# Patient Record
Sex: MALE | Race: BLACK OR AFRICAN AMERICAN | NOT HISPANIC OR LATINO | URBAN - METROPOLITAN AREA
[De-identification: names, ages, dates, MRNs, and addresses within clinical notes are randomized per-mention and may not be internally consistent; named-entity substitution may affect disease eponyms.]

---

## 2017-03-12 ENCOUNTER — EMERGENCY (EMERGENCY)
Facility: HOSPITAL | Age: 77
LOS: 1 days | Discharge: HOME | End: 2017-03-12
Attending: EMERGENCY MEDICINE | Admitting: EMERGENCY MEDICINE
Payer: COMMERCIAL

## 2017-03-12 VITALS
DIASTOLIC BLOOD PRESSURE: 75 MMHG | HEART RATE: 80 BPM | OXYGEN SATURATION: 97 % | SYSTOLIC BLOOD PRESSURE: 128 MMHG | HEIGHT: 64 IN | TEMPERATURE: 99 F | WEIGHT: 173.94 LBS | RESPIRATION RATE: 20 BRPM

## 2017-03-12 DIAGNOSIS — M54.2 CERVICALGIA: ICD-10-CM

## 2017-03-12 DIAGNOSIS — I10 ESSENTIAL (PRIMARY) HYPERTENSION: ICD-10-CM

## 2017-03-12 DIAGNOSIS — M19.90 UNSPECIFIED OSTEOARTHRITIS, UNSPECIFIED SITE: ICD-10-CM

## 2017-03-12 PROCEDURE — 99283 EMERGENCY DEPT VISIT LOW MDM: CPT | Mod: 25

## 2017-03-12 PROCEDURE — 72040 X-RAY EXAM NECK SPINE 2-3 VW: CPT | Mod: 26

## 2017-03-12 PROCEDURE — 99283 EMERGENCY DEPT VISIT LOW MDM: CPT

## 2017-03-12 PROCEDURE — 72040 X-RAY EXAM NECK SPINE 2-3 VW: CPT

## 2017-03-12 RX ORDER — IBUPROFEN 200 MG
1 TABLET ORAL
Qty: 12 | Refills: 0 | OUTPATIENT
Start: 2017-03-12

## 2017-03-12 RX ORDER — METHOCARBAMOL 500 MG/1
1 TABLET, FILM COATED ORAL
Qty: 12 | Refills: 0 | OUTPATIENT
Start: 2017-03-12 | End: 2017-03-15

## 2017-03-12 RX ORDER — IBUPROFEN 200 MG
600 TABLET ORAL ONCE
Qty: 0 | Refills: 0 | Status: COMPLETED | OUTPATIENT
Start: 2017-03-12 | End: 2017-03-12

## 2017-03-12 RX ORDER — METHOCARBAMOL 500 MG/1
1500 TABLET, FILM COATED ORAL ONCE
Qty: 0 | Refills: 0 | Status: COMPLETED | OUTPATIENT
Start: 2017-03-12 | End: 2017-03-12

## 2017-03-12 RX ADMIN — METHOCARBAMOL 1500 MILLIGRAM(S): 500 TABLET, FILM COATED ORAL at 12:12

## 2017-03-12 RX ADMIN — Medication 600 MILLIGRAM(S): at 12:12

## 2017-03-12 NOTE — ED STATDOCS - NS ED MD SCRIBE ATTENDING SCRIBE SECTIONS
HISTORY OF PRESENT ILLNESS/PHYSICAL EXAM/DISPOSITION/HIV/PAST MEDICAL/SURGICAL/SOCIAL HISTORY/VITAL SIGNS( Pullset)/INTAKE ASSESSMENT/SCREENINGS/REVIEW OF SYSTEMS

## 2017-03-12 NOTE — ED STATDOCS - OBJECTIVE STATEMENT
75 y/o M, with hx of HTN, presents to the ED complaining of neck pain x 4 days. Pt states neck pain has been gradually radiating bilaterally upwards to head. He reports difficulty moving his neck. No relief with Tylenol and ASA; ASA was last taken yesterday. Pt denies taking Motrin and Aleve. He denies blood thinners and takes ASA prn. He denies heavy lifting, exertion, falls, and injuries. Pt denies fever, nausea, vomiting, SOB and chest pain. Pt denies hx of CAD and DM. He denies seeing PCP for this complaint. Pt denies tobacco use and alcohol use. No further complaints at this time.   PCP: Dr. Farris

## 2017-03-12 NOTE — ED ADULT TRIAGE NOTE - CHIEF COMPLAINT QUOTE
pt reports having pain to his neck radiating to the back of his head x 4days. pain is relieved by aleve and aspirin but comes back once medication wears off. pt denies nausea or visual changes

## 2017-03-12 NOTE — ED STATDOCS - PROGRESS NOTE DETAILS
Neck pain for a few days. No UE weakness or sensory changes reported. No changes in  strength. No past episodes. No trauma reported.     Review XR findings. Will treat with medications and have patient f/u with ortho-spine. unable to locate patient to review discharge papers or review official XR read. Neck pain for a few days. No UE weakness or sensory changes reported. No changes in  strength. No past episodes. No trauma reported.   Reviewed preliminary XR read results. Will discharge with medications and spine-f/u    Review XR findings. Will treat with medications and have patient f/u with ortho-spine. contacted patient at home. Patient has improved with medications. F/U phone numbers provided. Prescriptions will be picked up tomorrow. Confirmed no sore throat or dysphagia.

## 2017-03-12 NOTE — ED STATDOCS - NEUROLOGICAL, MLM
No Kernig's or Bryzkinkis. normal strength in eyelids, symmetrical smile, tongue midline, normal shoulder shrugs, no pronator drift, normal thumb to other fingers, normal hand over hand, normal finger to nose, normal DTRs, normal strength in BUE and BLE, no Babinski's, normal knee-heel-shin, no Romberg's.

## 2017-03-12 NOTE — ED STATDOCS - ENMT, MLM
Nasal mucosa clear.  Mouth with normal mucosa  Throat has no vesicles, no oropharyngeal exudates and uvula is midline. No JVD. Normal vazquez

## 2017-10-31 ENCOUNTER — EMERGENCY (EMERGENCY)
Facility: HOSPITAL | Age: 77
LOS: 1 days | Discharge: HOME | End: 2017-10-31
Attending: EMERGENCY MEDICINE | Admitting: EMERGENCY MEDICINE
Payer: COMMERCIAL

## 2017-10-31 VITALS — HEIGHT: 64 IN | WEIGHT: 160.06 LBS

## 2017-10-31 LAB
ALBUMIN SERPL ELPH-MCNC: 3.9 G/DL — SIGNIFICANT CHANGE UP (ref 3.3–5.2)
ALP SERPL-CCNC: 80 U/L — SIGNIFICANT CHANGE UP (ref 40–120)
ALT FLD-CCNC: 11 U/L — SIGNIFICANT CHANGE UP
ANION GAP SERPL CALC-SCNC: 13 MMOL/L — SIGNIFICANT CHANGE UP (ref 5–17)
AST SERPL-CCNC: 20 U/L — SIGNIFICANT CHANGE UP
BASOPHILS # BLD AUTO: 0 K/UL — SIGNIFICANT CHANGE UP (ref 0–0.2)
BASOPHILS NFR BLD AUTO: 0.5 % — SIGNIFICANT CHANGE UP (ref 0–2)
BILIRUB SERPL-MCNC: 0.5 MG/DL — SIGNIFICANT CHANGE UP (ref 0.4–2)
BUN SERPL-MCNC: 10 MG/DL — SIGNIFICANT CHANGE UP (ref 8–20)
CALCIUM SERPL-MCNC: 9.2 MG/DL — SIGNIFICANT CHANGE UP (ref 8.6–10.2)
CHLORIDE SERPL-SCNC: 101 MMOL/L — SIGNIFICANT CHANGE UP (ref 98–107)
CO2 SERPL-SCNC: 27 MMOL/L — SIGNIFICANT CHANGE UP (ref 22–29)
CREAT SERPL-MCNC: 1.13 MG/DL — SIGNIFICANT CHANGE UP (ref 0.5–1.3)
EOSINOPHIL # BLD AUTO: 0.2 K/UL — SIGNIFICANT CHANGE UP (ref 0–0.5)
EOSINOPHIL NFR BLD AUTO: 2.8 % — SIGNIFICANT CHANGE UP (ref 0–5)
GLUCOSE SERPL-MCNC: 85 MG/DL — SIGNIFICANT CHANGE UP (ref 70–115)
HCT VFR BLD CALC: 34.4 % — LOW (ref 42–52)
HGB BLD-MCNC: 11.2 G/DL — LOW (ref 14–18)
LYMPHOCYTES # BLD AUTO: 2.3 K/UL — SIGNIFICANT CHANGE UP (ref 1–4.8)
LYMPHOCYTES # BLD AUTO: 37.5 % — SIGNIFICANT CHANGE UP (ref 20–55)
MCHC RBC-ENTMCNC: 29.2 PG — SIGNIFICANT CHANGE UP (ref 27–31)
MCHC RBC-ENTMCNC: 32.6 G/DL — SIGNIFICANT CHANGE UP (ref 32–36)
MCV RBC AUTO: 89.8 FL — SIGNIFICANT CHANGE UP (ref 80–94)
MONOCYTES # BLD AUTO: 0.6 K/UL — SIGNIFICANT CHANGE UP (ref 0–0.8)
MONOCYTES NFR BLD AUTO: 10.6 % — HIGH (ref 3–10)
NEUTROPHILS # BLD AUTO: 2.9 K/UL — SIGNIFICANT CHANGE UP (ref 1.8–8)
NEUTROPHILS NFR BLD AUTO: 48.4 % — SIGNIFICANT CHANGE UP (ref 37–73)
PLATELET # BLD AUTO: 274 K/UL — SIGNIFICANT CHANGE UP (ref 150–400)
POTASSIUM SERPL-MCNC: 3.6 MMOL/L — SIGNIFICANT CHANGE UP (ref 3.5–5.3)
POTASSIUM SERPL-SCNC: 3.6 MMOL/L — SIGNIFICANT CHANGE UP (ref 3.5–5.3)
PROT SERPL-MCNC: 8 G/DL — SIGNIFICANT CHANGE UP (ref 6.6–8.7)
RBC # BLD: 3.83 M/UL — LOW (ref 4.6–6.2)
RBC # FLD: 14 % — SIGNIFICANT CHANGE UP (ref 11–15.6)
SODIUM SERPL-SCNC: 141 MMOL/L — SIGNIFICANT CHANGE UP (ref 135–145)
WBC # BLD: 6 K/UL — SIGNIFICANT CHANGE UP (ref 4.8–10.8)
WBC # FLD AUTO: 6 K/UL — SIGNIFICANT CHANGE UP (ref 4.8–10.8)

## 2017-10-31 PROCEDURE — 74177 CT ABD & PELVIS W/CONTRAST: CPT

## 2017-10-31 PROCEDURE — 85027 COMPLETE CBC AUTOMATED: CPT

## 2017-10-31 PROCEDURE — 74177 CT ABD & PELVIS W/CONTRAST: CPT | Mod: 26

## 2017-10-31 PROCEDURE — 99284 EMERGENCY DEPT VISIT MOD MDM: CPT

## 2017-10-31 PROCEDURE — 99284 EMERGENCY DEPT VISIT MOD MDM: CPT | Mod: 25

## 2017-10-31 PROCEDURE — 80053 COMPREHEN METABOLIC PANEL: CPT

## 2017-10-31 PROCEDURE — 36415 COLL VENOUS BLD VENIPUNCTURE: CPT

## 2017-10-31 RX ORDER — SODIUM CHLORIDE 9 MG/ML
1000 INJECTION INTRAMUSCULAR; INTRAVENOUS; SUBCUTANEOUS ONCE
Qty: 0 | Refills: 0 | Status: COMPLETED | OUTPATIENT
Start: 2017-10-31 | End: 2017-10-31

## 2017-10-31 RX ORDER — SENNA PLUS 8.6 MG/1
2 TABLET ORAL
Qty: 6 | Refills: 0 | OUTPATIENT
Start: 2017-10-31 | End: 2017-11-03

## 2017-10-31 RX ORDER — METRONIDAZOLE 500 MG
1 TABLET ORAL
Qty: 20 | Refills: 0 | OUTPATIENT
Start: 2017-10-31 | End: 2017-11-10

## 2017-10-31 RX ORDER — METRONIDAZOLE 500 MG
500 TABLET ORAL ONCE
Qty: 0 | Refills: 0 | Status: COMPLETED | OUTPATIENT
Start: 2017-10-31 | End: 2017-10-31

## 2017-10-31 RX ORDER — DOCUSATE SODIUM 100 MG
1 CAPSULE ORAL
Qty: 6 | Refills: 0 | OUTPATIENT
Start: 2017-10-31 | End: 2017-11-03

## 2017-10-31 RX ORDER — CIPROFLOXACIN LACTATE 400MG/40ML
500 VIAL (ML) INTRAVENOUS ONCE
Qty: 0 | Refills: 0 | Status: COMPLETED | OUTPATIENT
Start: 2017-10-31 | End: 2017-10-31

## 2017-10-31 RX ORDER — CIPROFLOXACIN LACTATE 400MG/40ML
1 VIAL (ML) INTRAVENOUS
Qty: 20 | Refills: 0 | OUTPATIENT
Start: 2017-10-31 | End: 2017-11-10

## 2017-10-31 RX ADMIN — SODIUM CHLORIDE 1000 MILLILITER(S): 9 INJECTION INTRAMUSCULAR; INTRAVENOUS; SUBCUTANEOUS at 19:03

## 2017-10-31 RX ADMIN — Medication 500 MILLIGRAM(S): at 23:44

## 2017-10-31 NOTE — ED STATDOCS - OBJECTIVE STATEMENT
76 y/o M pt with hx of HTN, constipation presents to ED c/o abdominal distention and constipation for 2 weeks. Last bowel movement was 1 1/2 weeks ago. Positive flatulence. No nausea, vomiting. No further complaints at this time. 78 y/o M pt with hx of HTN, constipation presents to ED c/o abdominal distention and constipation for 2 weeks. Last bowel movement was 1 1/2 weeks ago. Positive flatulence. No nausea, vomiting, or abdominal pain. No further complaints at this time.

## 2017-10-31 NOTE — ED STATDOCS - MEDICAL DECISION MAKING DETAILS
Labs and abdominal CT scan. Labs and abdominal CT scan to r/o obstruction; though low suspicion given no pain and no vomiting.

## 2017-10-31 NOTE — ED STATDOCS - PROGRESS NOTE DETAILS
PA NOTE: Pt discussed at length with attending HPI/ROS/PE confirmed. PT seen resting computably in no acute distress in chair, PT states that he has had 6 days of watery dhiarhea, denies fever, chills, abd pain, N/V.   PE: ABD, nondistended, soft-nontender, no guarding, no rebound.   PLAN: PT tolerating PO intake,  PT informed of all results, PT informed of the need to have follow up colonoscopy for possibility of mass or CA, PT will be DC home with bowl regiment for retained stool, ABx, OTC pain control, follow up to GI and PCP, educated about when to return to the ED if needed. PT verbalizes that he understands all instructions and results

## 2017-10-31 NOTE — ED STATDOCS - ATTENDING CONTRIBUTION TO CARE
I, Jovana Bhatti, performed the initial face to face bedside interview with this patient regarding history of present illness, review of symptoms and relevant past medical, social and family history.  I completed an independent physical examination.  I was the initial provider who evaluated this patient. I have signed out the follow up of any pending tests (i.e. labs, radiological studies) to the ACP.  I have communicated the patient’s plan of care and disposition with the ACP.  The history, relevant review of systems, past medical and surgical history, medical decision making, and physical examination was documented by the scribe in my presence and I attest to the accuracy of the documentation.

## 2017-11-01 VITALS
HEART RATE: 60 BPM | DIASTOLIC BLOOD PRESSURE: 88 MMHG | TEMPERATURE: 98 F | OXYGEN SATURATION: 100 % | SYSTOLIC BLOOD PRESSURE: 184 MMHG | RESPIRATION RATE: 18 BRPM

## 2018-04-27 NOTE — ED STATDOCS - EYES, MLM
Communicable/Infectious
clear bilaterally.  Pupils 4mm equal, round, and reactive to light. + bilateral cataracts

## 2018-05-22 ENCOUNTER — EMERGENCY (EMERGENCY)
Facility: HOSPITAL | Age: 78
LOS: 1 days | Discharge: HOME | End: 2018-05-22
Attending: STUDENT IN AN ORGANIZED HEALTH CARE EDUCATION/TRAINING PROGRAM
Payer: COMMERCIAL

## 2018-05-22 VITALS
OXYGEN SATURATION: 100 % | HEIGHT: 66 IN | WEIGHT: 195.11 LBS | HEART RATE: 60 BPM | SYSTOLIC BLOOD PRESSURE: 186 MMHG | TEMPERATURE: 99 F | RESPIRATION RATE: 17 BRPM | DIASTOLIC BLOOD PRESSURE: 83 MMHG

## 2018-05-22 VITALS
HEART RATE: 51 BPM | DIASTOLIC BLOOD PRESSURE: 75 MMHG | SYSTOLIC BLOOD PRESSURE: 187 MMHG | RESPIRATION RATE: 20 BRPM | OXYGEN SATURATION: 100 % | TEMPERATURE: 97 F

## 2018-05-22 LAB
ANION GAP SERPL CALC-SCNC: 11 MMOL/L — SIGNIFICANT CHANGE UP (ref 5–17)
APPEARANCE UR: CLEAR — SIGNIFICANT CHANGE UP
APTT BLD: 37 SEC — SIGNIFICANT CHANGE UP (ref 27.5–37.4)
BACTERIA # UR AUTO: ABNORMAL
BASOPHILS # BLD AUTO: 0 K/UL — SIGNIFICANT CHANGE UP (ref 0–0.2)
BASOPHILS NFR BLD AUTO: 0.6 % — SIGNIFICANT CHANGE UP (ref 0–2)
BILIRUB UR-MCNC: NEGATIVE — SIGNIFICANT CHANGE UP
BUN SERPL-MCNC: 12 MG/DL — SIGNIFICANT CHANGE UP (ref 8–20)
CALCIUM SERPL-MCNC: 8.6 MG/DL — SIGNIFICANT CHANGE UP (ref 8.6–10.2)
CHLORIDE SERPL-SCNC: 105 MMOL/L — SIGNIFICANT CHANGE UP (ref 98–107)
CO2 SERPL-SCNC: 27 MMOL/L — SIGNIFICANT CHANGE UP (ref 22–29)
COLOR SPEC: YELLOW — SIGNIFICANT CHANGE UP
CREAT SERPL-MCNC: 0.87 MG/DL — SIGNIFICANT CHANGE UP (ref 0.5–1.3)
DIFF PNL FLD: NEGATIVE — SIGNIFICANT CHANGE UP
EOSINOPHIL # BLD AUTO: 0.3 K/UL — SIGNIFICANT CHANGE UP (ref 0–0.5)
EOSINOPHIL NFR BLD AUTO: 5.5 % — HIGH (ref 0–5)
EPI CELLS # UR: SIGNIFICANT CHANGE UP
GLUCOSE SERPL-MCNC: 95 MG/DL — SIGNIFICANT CHANGE UP (ref 70–115)
GLUCOSE UR QL: NEGATIVE MG/DL — SIGNIFICANT CHANGE UP
HCT VFR BLD CALC: 29.9 % — LOW (ref 42–52)
HGB BLD-MCNC: 9.1 G/DL — LOW (ref 14–18)
INR BLD: 1.13 RATIO — SIGNIFICANT CHANGE UP (ref 0.88–1.16)
KETONES UR-MCNC: NEGATIVE — SIGNIFICANT CHANGE UP
LEUKOCYTE ESTERASE UR-ACNC: ABNORMAL
LYMPHOCYTES # BLD AUTO: 2.3 K/UL — SIGNIFICANT CHANGE UP (ref 1–4.8)
LYMPHOCYTES # BLD AUTO: 45.4 % — SIGNIFICANT CHANGE UP (ref 20–55)
MAGNESIUM SERPL-MCNC: 2 MG/DL — SIGNIFICANT CHANGE UP (ref 1.6–2.6)
MCHC RBC-ENTMCNC: 26.8 PG — LOW (ref 27–31)
MCHC RBC-ENTMCNC: 30.4 G/DL — LOW (ref 32–36)
MCV RBC AUTO: 88.2 FL — SIGNIFICANT CHANGE UP (ref 80–94)
MONOCYTES # BLD AUTO: 0.5 K/UL — SIGNIFICANT CHANGE UP (ref 0–0.8)
MONOCYTES NFR BLD AUTO: 9.6 % — SIGNIFICANT CHANGE UP (ref 3–10)
NEUTROPHILS # BLD AUTO: 2 K/UL — SIGNIFICANT CHANGE UP (ref 1.8–8)
NEUTROPHILS NFR BLD AUTO: 38.7 % — SIGNIFICANT CHANGE UP (ref 37–73)
NITRITE UR-MCNC: NEGATIVE — SIGNIFICANT CHANGE UP
NT-PROBNP SERPL-SCNC: 223 PG/ML — SIGNIFICANT CHANGE UP (ref 0–300)
PH UR: 6 — SIGNIFICANT CHANGE UP (ref 5–8)
PLATELET # BLD AUTO: 222 K/UL — SIGNIFICANT CHANGE UP (ref 150–400)
POTASSIUM SERPL-MCNC: 4.2 MMOL/L — SIGNIFICANT CHANGE UP (ref 3.5–5.3)
POTASSIUM SERPL-SCNC: 4.2 MMOL/L — SIGNIFICANT CHANGE UP (ref 3.5–5.3)
PROT UR-MCNC: NEGATIVE MG/DL — SIGNIFICANT CHANGE UP
PROTHROM AB SERPL-ACNC: 12.5 SEC — SIGNIFICANT CHANGE UP (ref 9.8–12.7)
RBC # BLD: 3.39 M/UL — LOW (ref 4.6–6.2)
RBC # FLD: 18.8 % — HIGH (ref 11–15.6)
RBC CASTS # UR COMP ASSIST: SIGNIFICANT CHANGE UP /HPF (ref 0–4)
SODIUM SERPL-SCNC: 143 MMOL/L — SIGNIFICANT CHANGE UP (ref 135–145)
SP GR SPEC: 1.01 — SIGNIFICANT CHANGE UP (ref 1.01–1.02)
TROPONIN T SERPL-MCNC: <0.01 NG/ML — SIGNIFICANT CHANGE UP (ref 0–0.06)
UROBILINOGEN FLD QL: NEGATIVE MG/DL — SIGNIFICANT CHANGE UP
WBC # BLD: 5.1 K/UL — SIGNIFICANT CHANGE UP (ref 4.8–10.8)
WBC # FLD AUTO: 5.1 K/UL — SIGNIFICANT CHANGE UP (ref 4.8–10.8)
WBC UR QL: SIGNIFICANT CHANGE UP

## 2018-05-22 PROCEDURE — 99284 EMERGENCY DEPT VISIT MOD MDM: CPT

## 2018-05-22 PROCEDURE — 84484 ASSAY OF TROPONIN QUANT: CPT

## 2018-05-22 PROCEDURE — 36415 COLL VENOUS BLD VENIPUNCTURE: CPT

## 2018-05-22 PROCEDURE — 85730 THROMBOPLASTIN TIME PARTIAL: CPT

## 2018-05-22 PROCEDURE — 71046 X-RAY EXAM CHEST 2 VIEWS: CPT | Mod: 26

## 2018-05-22 PROCEDURE — 93970 EXTREMITY STUDY: CPT | Mod: 26

## 2018-05-22 PROCEDURE — 83880 ASSAY OF NATRIURETIC PEPTIDE: CPT

## 2018-05-22 PROCEDURE — 85027 COMPLETE CBC AUTOMATED: CPT

## 2018-05-22 PROCEDURE — 81001 URINALYSIS AUTO W/SCOPE: CPT

## 2018-05-22 PROCEDURE — 83735 ASSAY OF MAGNESIUM: CPT

## 2018-05-22 PROCEDURE — 93010 ELECTROCARDIOGRAM REPORT: CPT

## 2018-05-22 PROCEDURE — 80048 BASIC METABOLIC PNL TOTAL CA: CPT

## 2018-05-22 PROCEDURE — 85610 PROTHROMBIN TIME: CPT

## 2018-05-22 PROCEDURE — 93970 EXTREMITY STUDY: CPT

## 2018-05-22 PROCEDURE — 93005 ELECTROCARDIOGRAM TRACING: CPT

## 2018-05-22 PROCEDURE — 87086 URINE CULTURE/COLONY COUNT: CPT

## 2018-05-22 PROCEDURE — 71046 X-RAY EXAM CHEST 2 VIEWS: CPT

## 2018-05-22 RX ORDER — FUROSEMIDE 40 MG
20 TABLET ORAL ONCE
Qty: 0 | Refills: 0 | Status: COMPLETED | OUTPATIENT
Start: 2018-05-22 | End: 2018-05-22

## 2018-05-22 RX ORDER — FUROSEMIDE 40 MG
1 TABLET ORAL
Qty: 5 | Refills: 0 | OUTPATIENT
Start: 2018-05-22 | End: 2018-05-31

## 2018-05-22 RX ADMIN — Medication 20 MILLIGRAM(S): at 22:22

## 2018-05-22 NOTE — ED PROVIDER NOTE - OBJECTIVE STATEMENT
76 y/o M pt with hx of HTN, HLD, Hypothyroidism presents to ED c/o b/l lower extremity edema starting 2 days ago. Pt states he sleeps with 2 pillows. Unknown last time he's seen a cardiologist. Denies chest pain, SOB, fevers, chills, cough. No further complaints at this time.

## 2018-05-22 NOTE — ED PROVIDER NOTE - PROGRESS NOTE DETAILS
As per sign-out from Dr. Thompson, 76 yo male with acute lower extremity edema for the past two days whom presents from his pmd's office for evaluation.. Patient pending US. If negative Patient resting comfortably. Labs and radiology studies are as noted. Patient stable with dependent edema.

## 2018-05-22 NOTE — ED ADULT NURSE NOTE - OBJECTIVE STATEMENT
78y/o male c/o right lower exretmity swelling. Pt denies chest discomfort, numbness or tingling, N/V/D. Pt ROM to all extremities, abd soft non tender, non distended. will continue to monitor

## 2018-05-22 NOTE — ED ADULT NURSE NOTE - CHPI ED SYMPTOMS NEG
no chills/no shortness of breath/no dizziness/no vomiting/no chest pain/no cough/no diaphoresis/no back pain/no fever/no nausea/no syncope

## 2018-05-23 LAB
CULTURE RESULTS: NO GROWTH — SIGNIFICANT CHANGE UP
SPECIMEN SOURCE: SIGNIFICANT CHANGE UP

## 2018-10-03 PROBLEM — I10 ESSENTIAL (PRIMARY) HYPERTENSION: Chronic | Status: ACTIVE | Noted: 2017-03-12

## 2018-10-05 ENCOUNTER — APPOINTMENT (OUTPATIENT)
Dept: CARDIOLOGY | Facility: CLINIC | Age: 78
End: 2018-10-05
Payer: MEDICARE

## 2018-10-05 ENCOUNTER — NON-APPOINTMENT (OUTPATIENT)
Age: 78
End: 2018-10-05

## 2018-10-05 VITALS
BODY MASS INDEX: 22.88 KG/M2 | HEIGHT: 64 IN | DIASTOLIC BLOOD PRESSURE: 70 MMHG | SYSTOLIC BLOOD PRESSURE: 150 MMHG | HEART RATE: 60 BPM | WEIGHT: 134 LBS | OXYGEN SATURATION: 100 % | RESPIRATION RATE: 14 BRPM

## 2018-10-05 DIAGNOSIS — E03.9 HYPOTHYROIDISM, UNSPECIFIED: ICD-10-CM

## 2018-10-05 DIAGNOSIS — M10.9 GOUT, UNSPECIFIED: ICD-10-CM

## 2018-10-05 DIAGNOSIS — N40.0 BENIGN PROSTATIC HYPERPLASIA WITHOUT LOWER URINARY TRACT SYMPMS: ICD-10-CM

## 2018-10-05 DIAGNOSIS — Z78.9 OTHER SPECIFIED HEALTH STATUS: ICD-10-CM

## 2018-10-05 DIAGNOSIS — R60.0 LOCALIZED EDEMA: ICD-10-CM

## 2018-10-05 DIAGNOSIS — K61.1 RECTAL ABSCESS: ICD-10-CM

## 2018-10-05 DIAGNOSIS — Z86.79 PERSONAL HISTORY OF OTHER DISEASES OF THE CIRCULATORY SYSTEM: ICD-10-CM

## 2018-10-05 DIAGNOSIS — Z86.39 PERSONAL HISTORY OF OTHER ENDOCRINE, NUTRITIONAL AND METABOLIC DISEASE: ICD-10-CM

## 2018-10-05 PROCEDURE — 93000 ELECTROCARDIOGRAM COMPLETE: CPT

## 2018-10-05 PROCEDURE — 99205 OFFICE O/P NEW HI 60 MIN: CPT

## 2018-10-05 RX ORDER — OXYBUTYNIN CHLORIDE 10 MG/1
10 TABLET, EXTENDED RELEASE ORAL DAILY
Refills: 0 | Status: ACTIVE | COMMUNITY

## 2018-10-05 RX ORDER — TAMSULOSIN HYDROCHLORIDE 0.4 MG/1
0.4 CAPSULE ORAL DAILY
Refills: 0 | Status: ACTIVE | COMMUNITY

## 2018-10-05 RX ORDER — ALLOPURINOL 300 MG/1
300 TABLET ORAL
Refills: 0 | Status: ACTIVE | COMMUNITY

## 2018-10-05 RX ORDER — LEVOTHYROXINE SODIUM 0.12 MG/1
125 TABLET ORAL DAILY
Refills: 0 | Status: ACTIVE | COMMUNITY

## 2018-10-05 RX ORDER — PRAVASTATIN SODIUM 40 MG/1
40 TABLET ORAL
Refills: 0 | Status: ACTIVE | COMMUNITY

## 2018-10-23 ENCOUNTER — APPOINTMENT (OUTPATIENT)
Dept: CARDIOLOGY | Facility: CLINIC | Age: 78
End: 2018-10-23
Payer: MEDICARE

## 2018-10-26 ENCOUNTER — RESULT CHARGE (OUTPATIENT)
Age: 78
End: 2018-10-26

## 2018-10-26 DIAGNOSIS — R00.1 BRADYCARDIA, UNSPECIFIED: ICD-10-CM

## 2018-10-30 ENCOUNTER — APPOINTMENT (OUTPATIENT)
Dept: CARDIOLOGY | Facility: CLINIC | Age: 78
End: 2018-10-30
Payer: MEDICARE

## 2018-10-30 PROBLEM — R00.1 SINUS BRADYCARDIA: Status: ACTIVE | Noted: 2018-10-05

## 2018-10-30 PROCEDURE — A9500: CPT

## 2018-10-30 PROCEDURE — 93015 CV STRESS TEST SUPVJ I&R: CPT

## 2018-10-30 PROCEDURE — 78452 HT MUSCLE IMAGE SPECT MULT: CPT

## 2018-10-30 PROCEDURE — 93970 EXTREMITY STUDY: CPT

## 2018-11-19 ENCOUNTER — APPOINTMENT (OUTPATIENT)
Dept: PULMONOLOGY | Facility: CLINIC | Age: 78
End: 2018-11-19

## 2018-11-19 ENCOUNTER — FORM ENCOUNTER (OUTPATIENT)
Age: 78
End: 2018-11-19

## 2018-11-20 ENCOUNTER — OUTPATIENT (OUTPATIENT)
Dept: OUTPATIENT SERVICES | Facility: HOSPITAL | Age: 78
LOS: 1 days | End: 2018-11-20
Payer: COMMERCIAL

## 2018-11-20 DIAGNOSIS — Z01.810 ENCOUNTER FOR PREPROCEDURAL CARDIOVASCULAR EXAMINATION: ICD-10-CM

## 2018-11-20 PROCEDURE — 93306 TTE W/DOPPLER COMPLETE: CPT

## 2018-11-20 PROCEDURE — 93306 TTE W/DOPPLER COMPLETE: CPT | Mod: 26

## 2019-01-29 ENCOUNTER — APPOINTMENT (OUTPATIENT)
Dept: CARDIOLOGY | Facility: CLINIC | Age: 79
End: 2019-01-29

## 2019-06-14 ENCOUNTER — NON-APPOINTMENT (OUTPATIENT)
Age: 79
End: 2019-06-14

## 2019-06-14 ENCOUNTER — APPOINTMENT (OUTPATIENT)
Dept: CARDIOLOGY | Facility: CLINIC | Age: 79
End: 2019-06-14
Payer: MEDICARE

## 2019-06-14 VITALS
BODY MASS INDEX: 27.14 KG/M2 | OXYGEN SATURATION: 99 % | DIASTOLIC BLOOD PRESSURE: 80 MMHG | HEART RATE: 61 BPM | HEIGHT: 64 IN | WEIGHT: 159 LBS | SYSTOLIC BLOOD PRESSURE: 148 MMHG

## 2019-06-14 DIAGNOSIS — R94.31 ABNORMAL ELECTROCARDIOGRAM [ECG] [EKG]: ICD-10-CM

## 2019-06-14 DIAGNOSIS — I10 ESSENTIAL (PRIMARY) HYPERTENSION: ICD-10-CM

## 2019-06-14 DIAGNOSIS — Z01.810 ENCOUNTER FOR PREPROCEDURAL CARDIOVASCULAR EXAMINATION: ICD-10-CM

## 2019-06-14 DIAGNOSIS — E78.5 HYPERLIPIDEMIA, UNSPECIFIED: ICD-10-CM

## 2019-06-14 PROCEDURE — 93000 ELECTROCARDIOGRAM COMPLETE: CPT

## 2019-06-14 PROCEDURE — 99215 OFFICE O/P EST HI 40 MIN: CPT

## 2019-06-14 RX ORDER — LISINOPRIL 20 MG/1
20 TABLET ORAL DAILY
Qty: 30 | Refills: 0 | Status: ACTIVE | COMMUNITY
Start: 1900-01-01 | End: 1900-01-01

## 2019-06-14 NOTE — CARDIOLOGY SUMMARY
[No Ischemia] : no Ischemia [No Exercise Ind Arr] : no exercise induced arrhythmias [No Symptoms] : no Symptoms [___] : [unfilled] [LVEF ___%] : LVEF [unfilled]% [Mild] : mild LV dysfunction [Normal] : normal LA size [None] : no mitral regurgitation

## 2019-06-14 NOTE — HISTORY OF PRESENT ILLNESS
[Preoperative Visit] : for a medical evaluation prior to surgery [Scheduled Procedure ___] : a [unfilled] [Date of Surgery ___] : on [unfilled] [Surgeon Name ___] : surgeon: [unfilled] [Stable] : Stable [Fever] : no fever [Chills] : no chills [Chest Pain] : no chest pain [Fatigue] : no fatigue [Cough] : no cough [Dyspnea] : no dyspnea [Dysuria] : no dysuria [Urinary Frequency] : no urinary frequency [Nausea] : no nausea [Vomiting] : no vomiting [Diarrhea] : diarrhea [Abdominal Pain] : abdominal pain [Easy Bruising] : no easy bruising [Lower Extremity Swelling] : no lower extremity swelling [Poor Exercise Tolerance] : no poor exercise tolerance [Diabetes] : no diabetes [Cardiovascular Disease] : no cardiovascular disease [Pulmonary Disease] : no pulmonary disease [Anti-Platelet Agents] : no anti-platelet agents [Nicotine Dependence] : no nicotine dependence [Renal Disease] : no renal disease [Alcohol Use] : no  alcohol use [GI Disease] : no gastrointestinal disease [Sleep Apnea] : no sleep apnea [Thromboembolic Problems] : no thromboembolic problems [Clotting Disorder] : no clotting disorder [Frequent use of NSAIDs] : frequent use of NSAIDs [Bleeding Disorder] : no bleeding disorder [Transfusion Reaction] : no transfusion reaction [Steroid Use in Last 6 Months] : no steroid use in the last six months [Prior Anesthesia] : No prior anesthesia [Electrocardiogram] : ~T an ECG ~C was performed [Metabolic Capacity ___Mets%] : The patient has a metabolic capacity of [unfilled] Mets%  [Good] : Good [de-identified] : [facility]  [de-identified] : can walk > 20 blocks.  [FreeTextEntry1] : Pre-operative cardiovascular risk evaluation and management and LE edema \par \par he is scheduled for colonoscopy. he had one done in 2018. he is till bleeding and  he needs to get one more colonoscopy. \par \par \par \par old note: This is a 78 year old male with hhistory of hypertension and dirrhea and rectal abcess here for Pre-operative cardiovascular risk evaluation and management for colonoscopy for cancer screening\par patient complians of LE edema. patient states he he stands up the Edema comes back 1 hr\par Eveluate for dedicated reflux and DVT.  2 studies. \par he also complaisn of dirrehea\par No chest pain. NO dyspnea. No dizziness. + abdominal distension. \par

## 2019-06-14 NOTE — DISCUSSION/SUMMARY
[Procedure Low Risk] : the procedure risk is low [Optimized for Surgery] : the patient is optimized for surgery [As per surgery] : as per surgery [Continue] : Continue medications as currently directed [FreeTextEntry1] : This is a 78 year old male with history of hypertension and Gi disease here for Pre-operative cardiovascular risk evaluation and management for colonoscopy.\par 1) LE edema: mild.  not on diuretics. \par \par 2)  Pre-operative cardiovascular risk evaluation and management : no cardiac symptoms. Prior stres test no ischemia.  Pre-operative cardiovascular risk evaluation and management : No cardiac Symptoms. No diagnostic ischemic changes on ECG. METs > 4.  RCRI score < 1%. Deleon perioperative risk score < 1%. NSQIP score < 1%. No further cardiac work up is needed. Proceed for surgery as indicated.\par Any further work up will not change the risk of this patient. Benefits of surgery outweigh the risk. \par \par 3) Marked bradycardia: Asymptomatic. resolved.  \par 4) CMP: LVEF 52%. Hypertension heart disease. ct BP control.

## 2019-06-14 NOTE — PHYSICAL EXAM
[General Appearance - Well Developed] : well developed [Normal Appearance] : normal appearance [Well Groomed] : well groomed [General Appearance - Well Nourished] : well nourished [No Deformities] : no deformities [General Appearance - In No Acute Distress] : no acute distress [Normal Conjunctiva] : the conjunctiva exhibited no abnormalities [Eyelids - No Xanthelasma] : the eyelids demonstrated no xanthelasmas [Normal Oral Mucosa] : normal oral mucosa [No Oral Pallor] : no oral pallor [No Oral Cyanosis] : no oral cyanosis [Normal Jugular Venous A Waves Present] : normal jugular venous A waves present [No Jugular Venous Latham A Waves] : no jugular venous latham A waves [Normal Jugular Venous V Waves Present] : normal jugular venous V waves present [Respiration, Rhythm And Depth] : normal respiratory rhythm and effort [Exaggerated Use Of Accessory Muscles For Inspiration] : no accessory muscle use [Auscultation Breath Sounds / Voice Sounds] : lungs were clear to auscultation bilaterally [Heart Rate And Rhythm] : heart rate and rhythm were normal [Heart Sounds] : normal S1 and S2 [Murmurs] : no murmurs present [Abdomen Soft] : soft [Abdomen Tenderness] : non-tender [Abdomen Mass (___ Cm)] : no abdominal mass palpated [Nail Clubbing] : no clubbing of the fingernails [Petechial Hemorrhages (___cm)] : no petechial hemorrhages [Cyanosis, Localized] : no localized cyanosis [FreeTextEntry1] : 1+ LE edema.  [Skin Color & Pigmentation] : normal skin color and pigmentation [] : no rash [No Venous Stasis] : no venous stasis [Skin Lesions] : no skin lesions [No Xanthoma] : no  xanthoma was observed [No Skin Ulcers] : no skin ulcer [Oriented To Time, Place, And Person] : oriented to person, place, and time [Affect] : the affect was normal [Mood] : the mood was normal [No Anxiety] : not feeling anxious

## 2019-06-14 NOTE — REVIEW OF SYSTEMS
[see HPI] : see HPI [Abdominal Pain] : no abdominal pain [Nausea] : no nausea [Heartburn] : no heartburn [Change in Appetite] : no change in appetite [Negative] : Heme/Lymph

## 2019-06-17 ENCOUNTER — OTHER (OUTPATIENT)
Age: 79
End: 2019-06-17

## 2019-06-17 LAB
ALBUMIN SERPL ELPH-MCNC: 3.6 G/DL
ALP BLD-CCNC: 95 U/L
ALT SERPL-CCNC: 6 U/L
ANION GAP SERPL CALC-SCNC: 10 MMOL/L
AST SERPL-CCNC: 12 U/L
BILIRUB SERPL-MCNC: 0.4 MG/DL
BUN SERPL-MCNC: 13 MG/DL
CALCIUM SERPL-MCNC: 8.8 MG/DL
CHLORIDE SERPL-SCNC: 106 MMOL/L
CO2 SERPL-SCNC: 26 MMOL/L
CREAT SERPL-MCNC: 0.96 MG/DL
GLUCOSE SERPL-MCNC: 99 MG/DL
NT-PROBNP SERPL-MCNC: 100 PG/ML
POTASSIUM SERPL-SCNC: 4 MMOL/L
PROT SERPL-MCNC: 7 G/DL
SODIUM SERPL-SCNC: 141 MMOL/L
TSH SERPL-ACNC: 8.29 UIU/ML

## 2019-09-17 ENCOUNTER — APPOINTMENT (OUTPATIENT)
Dept: CARDIOLOGY | Facility: CLINIC | Age: 79
End: 2019-09-17

## 2020-07-03 ENCOUNTER — EMERGENCY (EMERGENCY)
Facility: HOSPITAL | Age: 80
LOS: 1 days | Discharge: HOME | End: 2020-07-03
Attending: EMERGENCY MEDICINE
Payer: MEDICARE

## 2020-07-03 VITALS
OXYGEN SATURATION: 97 % | HEART RATE: 83 BPM | WEIGHT: 184.97 LBS | RESPIRATION RATE: 18 BRPM | TEMPERATURE: 99 F | DIASTOLIC BLOOD PRESSURE: 92 MMHG | HEIGHT: 64 IN | SYSTOLIC BLOOD PRESSURE: 199 MMHG

## 2020-07-03 PROCEDURE — 99283 EMERGENCY DEPT VISIT LOW MDM: CPT

## 2020-07-03 RX ORDER — METHOCARBAMOL 500 MG/1
750 TABLET, FILM COATED ORAL ONCE
Refills: 0 | Status: COMPLETED | OUTPATIENT
Start: 2020-07-03 | End: 2020-07-03

## 2020-07-03 RX ADMIN — METHOCARBAMOL 750 MILLIGRAM(S): 500 TABLET, FILM COATED ORAL at 13:01

## 2020-07-03 NOTE — ED STATDOCS - CLINICAL SUMMARY MEDICAL DECISION MAKING FREE TEXT BOX
Patient with asymptomatic HTN, mild neck pain. Will give Robaxin. Ready for discharge with PCP follow up.

## 2020-07-03 NOTE — ED ADULT TRIAGE NOTE - CHIEF COMPLAINT QUOTE
pt A&Ox 4 came to ED From home c/o high blood pressure and left side neck tension x 2 days. Pt denies any chest pain, SOB, dizziness. Pt took BP meds this AM. NAD noted, ambulatory in ED

## 2020-07-03 NOTE — ED STATDOCS - OBJECTIVE STATEMENT
79 year old male with a hx of HTN, hyperlipidemia, hypothyroidism presenting to the ED because he noted his BP was high today. He also has intermittent mild L neck stiffness, relieved with ASA. He has had similar pain in the past. He denies chest pain, SOB, fever, weakness, numbness, tingling, vision changes. He states he called his his PMD, and was told to come to the ED. PT does not recall what his BP meds are but has been taking them. 79 year old male with a hx of HTN, hyperlipidemia, hypothyroidism presenting to the ED because he noted his BP was high today. He also has intermittent mild L neck stiffness, relieved with ASA. He has had similar pain in the past. He denies chest pain, SOB, fever, weakness, numbness, tingling, vision changes, leg pain, leg swelling, changes in urination. He states he called his PMD, and was told to come to the ED. PT does not recall what his BP meds are but has been taking them.

## 2020-07-03 NOTE — ED STATDOCS - PHYSICAL EXAMINATION
Gen: Well appearing in NAD  Head: NC/AT  Neck: trachea midline, mild L paraspinal tenderness to palpation, no bruit.   Resp:  No distress, lungs CTAB  CV: regular rate and rhythm.   Ext: no deformities  Neuro:  A&O appears non focal  Skin:  Warm and dry as visualized  Psych:  Normal affect and mood Gen: Well appearing in NAD  Head: NC/AT  Neck: trachea midline, mild L paraspinal tenderness to palpation, FROM, no bruit.   Resp:  No distress, lungs CTAB  CV: regular rate and rhythm.   Ext: no deformities, no edema, no calf ttp.  Neuro:  A&O appears non focal  Skin:  Warm and dry as visualized  Psych:  Normal affect and mood

## 2020-07-03 NOTE — ED STATDOCS - PATIENT PORTAL LINK FT
You can access the FollowMyHealth Patient Portal offered by [facility] by registering at the following website: [email or web address]. By joining [employer]’s FollowMyHealth portal, you will also be able to view your health information using other applications (apps) compatible with our system.

## 2020-07-03 NOTE — ED STATDOCS - NSFOLLOWUPINSTRUCTIONS_ED_ALL_ED_FT
- Follow up with your doctor within 2-3 days.   - If you are going to check your blood pressure, check it once per day at the same time. Keep a log to show your primary doctor.   - Take your medication as prescribed.   - Return to the ED for any new or worsening symptoms.     Hypertension    Hypertension, commonly called high blood pressure, is when the force of blood pumping through your arteries is too strong. Hypertension forces your heart to work harder to pump blood. Your arteries may become narrow or stiff. Having untreated or uncontrolled hypertension for a long period of time can cause heart attack, stroke, kidney disease, and other problems. If started on a medication, take exactly as prescribed by your health care professional. Maintain a healthy lifestyle and follow up with your primary care physician.    SEEK IMMEDIATE MEDICAL CARE IF YOU HAVE ANY OF THE FOLLOWING SYMPTOMS: severe headache, confusion, chest pain, abdominal pain, vomiting, or shortness of breath.

## 2020-08-17 NOTE — ED ADULT NURSE NOTE - NSSISCREENINGQ5_ED_A_ED
[phone] and ask for the [facility] GI doctor on call.  India [email or web address]

## 2021-02-24 NOTE — ED STATDOCS - NSTIMEPROVIDERCAREINITIATE_GEN_ER
SUBJECTIVE:   CC: Humberto Roberts is an 52 year old male who presents for preventative health visit.       Patient has been advised of split billing requirements and indicates understanding: Yes  Healthy Habits:     Getting at least 3 servings of Calcium per day:  Yes    Bi-annual eye exam:  Yes    Dental care twice a year:  Yes    Sleep apnea or symptoms of sleep apnea:  Sleep apnea    Diet:  Regular (no restrictions)    Frequency of exercise:  None    Taking medications regularly:  Yes    Medication side effects:  None    PHQ-2 Total Score: 0    Additional concerns today:  Yes      Humberto is a 52-year-old -American male who is here today for his general physical with several concerns.    1.  First is to follow-up on his high blood pressure for which he takes atenolol and hydrochlorothiazide.  Been taking them as prescribed with no side effect.  Not checking his blood pressure at home.  No headache or dizziness.  No chest pain, shortness of breath, dyspnea, leg swelling or orthopnea.  Not exercising.  Denied of excessive salt/caffeine intake.    2.  Second concern is about acid reflux.  Has had it for years.  Still has a lot of acid reflux symptoms despite of taking omeprazole regularly.  Been tasting the acid in his throat at night.  Also been feeling something stuck on his throat when he swallows, especially the capsule pills.  No choking.  No excessive bloatedness.  No melena or coffee-ground emesis.  No abdominal pain.  Not tolerable tto greasy or spicy food.  He takes diclofenac as needed for his back pain.  Has chronic back pain and is known to have arthritis.  Overall the back pain is tolerable with the diclofenac and Zanaflex.  Also has high uric acid level with history of gout.  Allopurinol has been working very well.  He has colchicine to take as needed for flare up and has been working well as well.    3.  He takes trazodone for sleeping which has been working very well.  However if you take it  more than 3 days in a row, he would have significant leg cramping.  He is interested to try something different.  He has trouble with falling and maintaining sleep when not taking the trazodone.  Ambien worked well for him in the past.    3.   Not tolerating CPAP well for sleep apnea and therefore has not used it.  Last the sleep study was in 2015.  Has significant weight gain since then.  Was referred to sleep specialist but the appointment was canceled because of the Covid pandemic.  Interested to see the sleep specialist again.    4.  Otherwise, he is doing well.  No major medical care or procedure done since the last physical over a year ago. No headache or dizziness.  No running nose, nasal congestion, coughing, fever or chill. No CP/SOB. No N/V/D/C. No problem with urination.  No abdominal pain. Denied of STD risks.  No leg swelling.  Not exercising.  No alcohol, drugs or tobacco use. Feels safe at home, not working and lives with his wife.  Denied of being depressed. No other concern today    Today's PHQ-2 Score:   PHQ-2 ( 1999 Pfizer) 2/24/2021   Q1: Little interest or pleasure in doing things 0   Q2: Feeling down, depressed or hopeless 0   PHQ-2 Score 0   Q1: Little interest or pleasure in doing things Not at all   Q2: Feeling down, depressed or hopeless Not at all   PHQ-2 Score 0       Abuse: Current or Past(Physical, Sexual or Emotional)- No  Do you feel safe in your environment? Yes    Have you ever done Advance Care Planning? (For example, a Health Directive, POLST, or a discussion with a medical provider or your loved ones about your wishes): No, advance care planning information given to patient to review.  Advanced care planning was discussed at today's visit.    Social History     Tobacco Use     Smoking status: Never Smoker     Smokeless tobacco: Never Used   Substance Use Topics     Alcohol use: No     Alcohol/week: 0.0 standard drinks     If you drink alcohol do you typically have >3 drinks per  day or >7 drinks per week? Not applicable    Alcohol Use 2/24/2021   Prescreen: >3 drinks/day or >7 drinks/week? No   No flowsheet data found.    Last PSA:   PSA   Date Value Ref Range Status   12/04/2019 0.63 0 - 4 ug/L Final     Comment:     Assay Method:  Chemiluminescence using Siemens Vista analyzer       Reviewed orders with patient. Reviewed health maintenance and updated orders accordingly - Yes  BP Readings from Last 3 Encounters:   02/24/21 122/74   12/17/19 135/83   12/04/19 136/84    Wt Readings from Last 3 Encounters:   02/24/21 148.8 kg (328 lb)   12/04/19 144.5 kg (318 lb 9.6 oz)   09/16/19 144.4 kg (318 lb 6.4 oz)                  Patient Active Problem List   Diagnosis     GERD (gastroesophageal reflux disease)     Hypertension goal BP (blood pressure) < 140/90     Elevated serum creatinine     Insomnia     Morbid obesity (H)     Seasonal allergic rhinitis due to pollen, unspecified chronicity     RICARDO (obstructive sleep apnea) - on cpap     Chronic bilateral low back pain without sciatica     Vitamin D deficiency     Hyperuricemia     Past Surgical History:   Procedure Laterality Date     ENT SURGERY         Social History     Tobacco Use     Smoking status: Never Smoker     Smokeless tobacco: Never Used   Substance Use Topics     Alcohol use: No     Alcohol/week: 0.0 standard drinks     Family History   Problem Relation Age of Onset     Diabetes Mother      Hypertension Mother      Heart Disease Mother      Heart Disease Father      Diabetes Father      Unknown/Adopted Maternal Grandmother      Unknown/Adopted Maternal Grandfather      Unknown/Adopted Paternal Grandmother      Unknown/Adopted Paternal Grandfather      No Known Problems Sister      No Known Problems Son      No Known Problems Daughter      No Known Problems Sister      No Known Problems Son      No Known Problems Daughter          Current Outpatient Medications   Medication Sig Dispense Refill     allopurinol (ZYLOPRIM) 100 MG  tablet TAKE ONE TABLET BY MOUTH TWICE A DAY 60 tablet 0     ASPIRIN LOW DOSE 81 MG EC tablet TAKE ONE TABLET BY MOUTH ONCE DAILY 90 tablet 0     atenolol (TENORMIN) 50 MG tablet TAKE ONE TABLET BY MOUTH ONCE DAILY 90 tablet 0     cetirizine (ZYRTEC) 10 MG tablet TAKE ONE TABLET BY MOUTH ONCE DAILY AS NEEDED FOR ALLERGIES 30 tablet 5     colchicine (COLCYRS) 0.6 MG tablet TAKE 2 TABLETS BY MOUTH AT THE FIRST SIGN OF FLARE, TAKE 1-2 ADDITIONAL TABLETS PER DAY UNTIL SYMPTOMS IMPROVE THEN STOP THIS MEDICATION. DO NOT USE MORE THAN A FIVE DAYS 20 tablet 0     diclofenac (VOLTAREN) 75 MG EC tablet TAKE ONE TABLET BY MOUTH TWICE A DAY AS NEEDED FOR MODERATE PAIN 60 tablet 0     fluticasone (FLONASE) 50 MCG/ACT nasal spray SPRAY 1-2 SPRAYS IN EACH NOSTRIL EVERY DAY 16 g 5     hydrochlorothiazide (HYDRODIURIL) 25 MG tablet TAKE ONE TABLET BY MOUTH ONCE DAILY 30 tablet 0     order for DME 1 Device Auto CPAP @@ 7-15 cm with heated humidity via mask of choice       pantoprazole (PROTONIX) 40 MG EC tablet Take 1 tablet (40 mg) by mouth daily Stop omeprazole 90 tablet 1     QUEtiapine (SEROQUEL) 25 MG tablet Take 1-2 tablets (25-50 mg) by mouth At Bedtime 60 tablet 0     tiZANidine (ZANAFLEX) 4 MG tablet TAKE ONE TABLET BY MOUTH EVERY NIGHT AT BEDTIME AND TAKE ONE TABLET BY MOUTH EVERY MORNING AS NEEDEd 180 tablet 0     order for DME Equipment being ordered: shower chair (Patient not taking: Reported on 12/4/2019) 1 Device 0     No Known Allergies  Recent Labs   Lab Test 02/24/21  1452 12/04/19  1550 12/20/18  0830 12/20/18  0830   A1C 6.1*  --   --   --    LDL 82 90  --  97   HDL 30* 34*  --  30*   TRIG 190* 151*  --  105   ALT 46 40  --  46   CR 1.01 1.07   < > 1.28*   GFRESTIMATED 86 81   < > 65   GFRESTBLACK >90 >90   < > 76   POTASSIUM 3.7 3.9   < > 4.0   TSH 1.26 1.57  --  1.14    < > = values in this interval not displayed.        Reviewed and updated as needed this visit by clinical staff  Tobacco  Allergies  Meds    "Med Hx  Surg Hx  Fam Hx  Soc Hx        Reviewed and updated as needed this visit by Provider                Past Medical History:   Diagnosis Date     Acid reflux disease since 2006     Back pain chronic     Cellulitis of left upper arm and forearm 11/22/2013     Hypertension       Past Surgical History:   Procedure Laterality Date     ENT SURGERY         Review of Systems  Please see subjective otherwise the complete review of system was negative.    OBJECTIVE:   /74   Pulse 72   Temp 97.8  F (36.6  C) (Temporal)   Resp 18   Ht 1.88 m (6' 2\")   Wt 148.8 kg (328 lb)   SpO2 97%   BMI 42.11 kg/m      Physical Exam   GENERAL: healthy, alert and no distress  EYES: Eyes grossly normal to inspection, PERRL and conjunctivae and sclerae normal.  No nystagmus.  All 4 visual fields are intact  HENT: Ear canals and TM's normal.  Nares are non-congested. Oropharynx is pink and moist. No tender with palpation to the sinuses.  NECK: no adenopathy, supple, no lymphadenopathy or thyromegaly.  No tender with palpation to the cervical spine or its paraspinous muscle.  RESP: lungs clear to auscultation - no rales, rhonchi or wheezes  CV: regular rate and rhythm, no murmur.  ABDOMEN: soft, nondistended, nontender, no palpable masses or organomegaly with normal bowel sounds.  MS: no gross musculoskeletal defects noted, no edema.  Walk with no limping, normal gait.  All 4 extremities are equally in strength. Ankle, knees, hips, shoulders, elbows and wrists exams normal.  Normal fine motor skills on fingers.  Back is straight, no lordosis or scoliosis.  No tender with palpation.  SKIN: no suspicious lesions or rashes.  Nigricans acanthosis noted.  Multiple large skin tags noted on the trunk and neck  NEURO: Normal strength and tone, mentation intact and speech normal.  Cranial nerves II through XII intact, DTR +2 throughout, no focal neurological deficit.  PSYCH: mentation appears normal, affect normal/bright.  Thoughts " intact, no hallucination.  No suicidal or homicidal ideation.  LYMPH: no cervical, supraclavicular or axillary adenopathy.   (male): Offered and recommended but he declined.  He has no concern about it.    Diagnostic Test Results:  Labs reviewed in Epic  Results for orders placed or performed in visit on 02/24/21   Comprehensive metabolic panel     Status: Abnormal   Result Value Ref Range    Sodium 140 133 - 144 mmol/L    Potassium 3.7 3.4 - 5.3 mmol/L    Chloride 108 94 - 109 mmol/L    Carbon Dioxide 29 20 - 32 mmol/L    Anion Gap 3 3 - 14 mmol/L    Glucose 103 (H) 70 - 99 mg/dL    Urea Nitrogen 10 7 - 30 mg/dL    Creatinine 1.01 0.66 - 1.25 mg/dL    GFR Estimate 86 >60 mL/min/[1.73_m2]    GFR Estimate If Black >90 >60 mL/min/[1.73_m2]    Calcium 9.1 8.5 - 10.1 mg/dL    Bilirubin Total 0.3 0.2 - 1.3 mg/dL    Albumin 4.0 3.4 - 5.0 g/dL    Protein Total 7.2 6.8 - 8.8 g/dL    Alkaline Phosphatase 86 40 - 150 U/L    ALT 46 0 - 70 U/L    AST 24 0 - 45 U/L   Hemoglobin A1c     Status: Abnormal   Result Value Ref Range    Hemoglobin A1C 6.1 (H) 0 - 5.6 %   Lipid panel reflex to direct LDL Fasting     Status: Abnormal   Result Value Ref Range    Cholesterol 150 <200 mg/dL    Triglycerides 190 (H) <150 mg/dL    HDL Cholesterol 30 (L) >39 mg/dL    LDL Cholesterol Calculated 82 <100 mg/dL    Non HDL Cholesterol 120 <130 mg/dL   TSH with free T4 reflex     Status: None   Result Value Ref Range    TSH 1.26 0.40 - 4.00 mU/L   PROSTATE SPEC ANTIGEN SCREEN     Status: None   Result Value Ref Range    PSA 0.76 0 - 4 ug/L   Vitamin D Deficiency     Status: None   Result Value Ref Range    Vitamin D Deficiency screening 25 20 - 75 ug/L   Hepatitis C antibody     Status: None   Result Value Ref Range    Hepatitis C Antibody Nonreactive NR^Nonreactive   Uric acid     Status: None   Result Value Ref Range    Uric Acid 5.8 3.5 - 7.2 mg/dL   Magnesium     Status: None   Result Value Ref Range    Magnesium 2.0 1.6 - 2.3 mg/dL        ASSESSMENT/PLAN:   1. Routine general medical examination at a health care facility  Overall Humberto overall is doing well with multiple chronic medical conditions.  Recommended but declined the shingles and flu vaccination.  Discussed about safety issue, healthy diet, exercising, weight loss, good sleeping hygiene, substance/alcohol history prevention, and depression prevention. Recommended daily exercise for at least 30 minutes.  Emphasized on healthy diet with adequate fluid intake and resting. Feels safe at home. Follow in 1 year, earlier as needed.  Labs as ordered below.  Discussed about colon cancer screening and he is interested in colonoscopy which therefore referred today. Discussed with him about the pros and cons of prostate screening cancer with PSA.  Also educated about the potential false positive which may require unnecessary work-up.  He was informed of negative/normal PSA leve does not rule out prostate cancer completely although it is very unlikely.  He understands and is willing to take the risk..  All of his questions were answered.    - Comprehensive metabolic panel  - Hemoglobin A1c  - Lipid panel reflex to direct LDL Fasting  - TSH with free T4 reflex  - PROSTATE SPEC ANTIGEN SCREEN  - Vitamin D Deficiency  - Hepatitis C antibody  - GASTROENTEROLOGY ADULT REF PROCEDURE ONLY; Future  - Magnesium    2. Hypertension goal BP (blood pressure) < 140/90  BP is normal and stable. Continue with the current doses of atenolol and hydrochlorothiazide, been tolerating them well.  Emphasized on healthy/low salt diet, daily excercise and weight loss.  Avoid high sugar/caffeine intake. Lab as ordered.  Follow up in 6 month, earlier as needed.    - COMPREHENSIVE WEIGHT MANAGEMENT    3. Chronic bilateral low back pain without sciatica  Known to have arthritis of the spine.  Overall is doing well.  Continue with the diclofenac and Zanaflex as needed.  Emphasized on importance weight loss.  Continue with  his normal activities as tolerated.    - COMPREHENSIVE WEIGHT MANAGEMENT    4. Gastroesophageal reflux disease, unspecified whether esophagitis present  Not controlled despite of taking the omeprazole.  Has had it for years.  Been having a lot of acid taste with globus sensation.  Switched to Protonix and sent for upper endoscopy.  Encouraged to avoid spicy/greasy food.  Also avoid late meals as well as high caffeine/sugar intake.    - GASTROENTEROLOGY ADULT REF PROCEDURE ONLY; Future  - pantoprazole (PROTONIX) 40 MG EC tablet; Take 1 tablet (40 mg) by mouth daily Stop omeprazole  Dispense: 90 tablet; Refill: 1    5. Morbid obesity (H)  Today's BMI was 42 with multiple comorbidities include high blood pressure, sleep apnea, chronic low back pain, and prediabetes.  Discussed with him about the nature of the condition and its long term and short term effects.  Encouraged exercising and healthy diet as discussed above.  Also discussed about formal logical and surgical intervention.  He is interested to look into surgery and therefore refer him to gastric bypass surge kay for further evaluation.  Discussed about the goal for his weight and his BMI.  TSH level was checked today and it was normal.    - COMPREHENSIVE WEIGHT MANAGEMENT  - SLEEP ENT REFERRAL    6. RICARDO (obstructive sleep apnea) - on cpap  Encouraged weight loss.  Refer to sleep specialist for reevaluation.  Not tolerated CPAP well.  Last the sleep study was in 2015.    - COMPREHENSIVE WEIGHT MANAGEMENT  - SLEEP ENT REFERRAL    7. Vitamin D deficiency  Resolving.  Continue with the vitamin D supplement.    8. Hyperuricemia  Controlled.  Tolerated allopurinol well.  Uric acid level was normal today.  kidney function is also stable/normal.  Will continue with the allopurinol.    - Uric acid    9. Elevated serum creatinine  Resolved.    10. Psychophysiological insomnia  Discussed about good sleeping hygiene.  Stop the trazodone, will have try him try the  "Seroquel.    - QUEtiapine (SEROQUEL) 25 MG tablet; Take 1-2 tablets (25-50 mg) by mouth At Bedtime  Dispense: 60 tablet; Refill: 0    11. Acanthosis nigricans  Discussed with him about the nature of the condition.  Inform him that with this, he is at a higher risk for developing DM/HTN in the future and therefore they need to be monitored closely.  Emphasize on healthy life style modification and strongly encourage aerobic exercise daily.  Also recommend healthy diet and weight loss.  Recommend to check for DM at least once a year. Labs today as ordered      12. Skin tag  Reassured him that it is benign in nature.  Return for their removal if they become irritated/bothersome.    13. Pre-diabetes  Her hemoglobin A1c is 6.1.  Again, emphasized on healthy lifestyle modification as discussed above.  Will continue to monitor closely, checking hemoglobin A1c every 6 months.      Patient has been advised of split billing requirements and indicates understanding: Yes  COUNSELING:   Reviewed preventive health counseling, as reflected in patient instructions       Regular exercise       Healthy diet/nutrition       Immunizations    Declined: Influenza and Zoster due to Conscientious objector               Aspirin prophylaxis        Safe sex practices/STD prevention       Consider Hep C screening for all patients one time for ages 18-79 years       Colon cancer screening       Prostate cancer screening       Osteoporosis prevention/bone health       Advance Care Planning    Estimated body mass index is 42.11 kg/m  as calculated from the following:    Height as of this encounter: 1.88 m (6' 2\").    Weight as of this encounter: 148.8 kg (328 lb).     Weight management plan: Patient referred to endocrine and/or weight management specialty    He reports that he has never smoked. He has never used smokeless tobacco.      Counseling Resources:  ATP IV Guidelines  Pooled Cohorts Equation Calculator  FRAX Risk Assessment  ICSI " Preventive Guidelines  Dietary Guidelines for Americans, 2010  USDA's MyPlate  ASA Prophylaxis  Lung CA Screening    Calos Ty Mai, MD  [facility]   03-Jul-2020 12:37

## 2022-09-21 NOTE — ADDENDUM
Make a follow-up appointment with Podiatry, get new inserts for issues, continue with exercises, continue to try schedule with physical therapy, may use Motrin 600 mg t i d  [[email or web address]] :  Pre-operative cardiovascular risk evaluation and management : No cardiac Symptoms. No diagnostic ischemic changes on ECG. METs > 4.  Normal stress test. unremarkable echo.  RCRI score < 1%. Deleon perioperative risk score < 1%. NSQIP score < 1%. No further cardiac work up is needed. Proceed for surgery as indicated.
